# Patient Record
Sex: FEMALE | Race: OTHER | NOT HISPANIC OR LATINO
[De-identification: names, ages, dates, MRNs, and addresses within clinical notes are randomized per-mention and may not be internally consistent; named-entity substitution may affect disease eponyms.]

---

## 2019-07-08 ENCOUNTER — NON-APPOINTMENT (OUTPATIENT)
Age: 59
End: 2019-07-08

## 2019-07-08 ENCOUNTER — APPOINTMENT (OUTPATIENT)
Dept: OPHTHALMOLOGY | Facility: CLINIC | Age: 59
End: 2019-07-08
Payer: MEDICAID

## 2019-07-08 PROCEDURE — 92014 COMPRE OPH EXAM EST PT 1/>: CPT

## 2019-07-08 PROCEDURE — 92133 CPTRZD OPH DX IMG PST SGM ON: CPT

## 2019-09-04 ENCOUNTER — EMERGENCY (EMERGENCY)
Facility: HOSPITAL | Age: 59
LOS: 1 days | Discharge: HOME | End: 2019-09-04
Attending: EMERGENCY MEDICINE
Payer: MEDICAID

## 2019-09-04 VITALS
RESPIRATION RATE: 16 BRPM | OXYGEN SATURATION: 99 % | HEART RATE: 109 BPM | SYSTOLIC BLOOD PRESSURE: 140 MMHG | DIASTOLIC BLOOD PRESSURE: 90 MMHG | TEMPERATURE: 99 F

## 2019-09-04 PROCEDURE — 73030 X-RAY EXAM OF SHOULDER: CPT | Mod: 26,LT

## 2019-09-04 PROCEDURE — 71045 X-RAY EXAM CHEST 1 VIEW: CPT | Mod: 26

## 2019-09-04 PROCEDURE — 73060 X-RAY EXAM OF HUMERUS: CPT | Mod: 26,LT

## 2019-09-04 PROCEDURE — 70450 CT HEAD/BRAIN W/O DYE: CPT | Mod: 26

## 2019-09-04 PROCEDURE — 99285 EMERGENCY DEPT VISIT HI MDM: CPT

## 2019-09-04 RX ORDER — MORPHINE SULFATE 50 MG/1
4 CAPSULE, EXTENDED RELEASE ORAL ONCE
Refills: 0 | Status: DISCONTINUED | OUTPATIENT
Start: 2019-09-04 | End: 2019-09-04

## 2019-09-04 RX ORDER — TETANUS TOXOID, REDUCED DIPHTHERIA TOXOID AND ACELLULAR PERTUSSIS VACCINE, ADSORBED 5; 2.5; 8; 8; 2.5 [IU]/.5ML; [IU]/.5ML; UG/.5ML; UG/.5ML; UG/.5ML
0.5 SUSPENSION INTRAMUSCULAR ONCE
Refills: 0 | Status: COMPLETED | OUTPATIENT
Start: 2019-09-04 | End: 2019-09-04

## 2019-09-04 RX ADMIN — MORPHINE SULFATE 4 MILLIGRAM(S): 50 CAPSULE, EXTENDED RELEASE ORAL at 23:25

## 2019-09-04 RX ADMIN — TETANUS TOXOID, REDUCED DIPHTHERIA TOXOID AND ACELLULAR PERTUSSIS VACCINE, ADSORBED 0.5 MILLILITER(S): 5; 2.5; 8; 8; 2.5 SUSPENSION INTRAMUSCULAR at 23:51

## 2019-09-04 RX ADMIN — MORPHINE SULFATE 4 MILLIGRAM(S): 50 CAPSULE, EXTENDED RELEASE ORAL at 21:51

## 2019-09-04 RX ADMIN — MORPHINE SULFATE 4 MILLIGRAM(S): 50 CAPSULE, EXTENDED RELEASE ORAL at 23:51

## 2019-09-04 NOTE — ED ADULT NURSE NOTE - NSIMPLEMENTINTERV_GEN_ALL_ED
Implemented All Fall Risk Interventions:  [place] to call system. Call bell, personal items and telephone within reach. Instruct patient to call for assistance. Room bathroom lighting operational. Non-slip footwear when patient is off stretcher. Physically safe environment: no spills, clutter or unnecessary equipment. Stretcher in lowest position, wheels locked, appropriate side rails in place. Provide visual cue, wrist band, yellow gown, etc. Monitor gait and stability. Monitor for mental status changes and reorient to person, place, and time. Review medications for side effects contributing to fall risk. Reinforce activity limits and safety measures with patient and family.

## 2019-09-04 NOTE — ED ADULT NURSE REASSESSMENT NOTE - NS ED NURSE REASSESS COMMENT FT1
Pt reporting increased pain at this time; MD Simons aware, will medicate per MD orders. safety maintained. family at bedside.

## 2019-09-04 NOTE — ED ADULT NURSE NOTE - OBJECTIVE STATEMENT
58y Female PMH ___ presents to the ED c/o fall. Pt states that she was walking outside around 2050 and tripped on a dog leash and fell onto concrete on her L. side. Pt denies LOC or dizziness/CP prior to fall. Pt reporting severe 10/10 pain to L. shoulder and is supporting it with other arm. Pt also reporting mild headache at this time. Pt a&oX4, airway intact, breathing spontaneously and unlabored, lung sounds clear bilaterally, abd soft nondistended nontender to palpation, +peripheral pulses, cap refill <3 seconds, gross neuro intact, Hematoma noted to L. forehead with abrasion on it, no active bleeding or discharge noted to site, 58y Female PMH ___ presents to the ED c/o fall. Pt states that she was walking outside around 2050 and tripped on a dog leash and fell onto concrete on her L. side. Pt denies LOC or dizziness/CP prior to fall. Pt reporting severe 10/10 pain to L. shoulder and is supporting it with other arm. Pt also reporting mild headache at this time. Pt a&oX4, airway intact, breathing spontaneously and unlabored, lung sounds clear bilaterally, abd soft nondistended nontender to palpation, +peripheral pulses, cap refill <3 seconds, gross neuro intact, Hematoma noted to L. forehead with abrasion on it, no active bleeding or discharge noted to site, limited ROM noted to L. arm/shoulder due to pain, no obvious deformities noted, pt denies dizziness, CP, SOB, numbness/tingling, abd pain, pelvic pain, fever/chills. MD at bedside for eval. safety maintained.

## 2019-09-05 VITALS
TEMPERATURE: 98 F | OXYGEN SATURATION: 99 % | SYSTOLIC BLOOD PRESSURE: 122 MMHG | RESPIRATION RATE: 18 BRPM | HEART RATE: 68 BPM | DIASTOLIC BLOOD PRESSURE: 80 MMHG

## 2019-09-05 PROCEDURE — 96374 THER/PROPH/DIAG INJ IV PUSH: CPT

## 2019-09-05 PROCEDURE — 90715 TDAP VACCINE 7 YRS/> IM: CPT

## 2019-09-05 PROCEDURE — 70450 CT HEAD/BRAIN W/O DYE: CPT

## 2019-09-05 PROCEDURE — 90471 IMMUNIZATION ADMIN: CPT

## 2019-09-05 PROCEDURE — 93005 ELECTROCARDIOGRAM TRACING: CPT

## 2019-09-05 PROCEDURE — 73060 X-RAY EXAM OF HUMERUS: CPT

## 2019-09-05 PROCEDURE — 96376 TX/PRO/DX INJ SAME DRUG ADON: CPT

## 2019-09-05 PROCEDURE — 99284 EMERGENCY DEPT VISIT MOD MDM: CPT | Mod: 25

## 2019-09-05 PROCEDURE — 73030 X-RAY EXAM OF SHOULDER: CPT

## 2019-09-05 PROCEDURE — 71045 X-RAY EXAM CHEST 1 VIEW: CPT

## 2019-09-05 RX ORDER — OXYCODONE HYDROCHLORIDE 5 MG/1
1 TABLET ORAL
Qty: 12 | Refills: 0
Start: 2019-09-05 | End: 2019-09-07

## 2019-09-05 RX ORDER — IBUPROFEN 200 MG
600 TABLET ORAL ONCE
Refills: 0 | Status: COMPLETED | OUTPATIENT
Start: 2019-09-05 | End: 2019-09-05

## 2019-09-05 RX ORDER — HYDROMORPHONE HYDROCHLORIDE 2 MG/ML
0.5 INJECTION INTRAMUSCULAR; INTRAVENOUS; SUBCUTANEOUS ONCE
Refills: 0 | Status: DISCONTINUED | OUTPATIENT
Start: 2019-09-05 | End: 2019-09-05

## 2019-09-05 RX ORDER — OXYCODONE HYDROCHLORIDE 5 MG/1
5 TABLET ORAL ONCE
Refills: 0 | Status: DISCONTINUED | OUTPATIENT
Start: 2019-09-05 | End: 2019-09-05

## 2019-09-05 RX ADMIN — OXYCODONE HYDROCHLORIDE 5 MILLIGRAM(S): 5 TABLET ORAL at 00:59

## 2019-09-05 RX ADMIN — Medication 600 MILLIGRAM(S): at 00:59

## 2019-09-05 NOTE — ED PROVIDER NOTE - CONSTITUTIONAL, MLM
normal... screaming/crying in pain, difficult to redirect, but cooperative with exam; awake/alert, oriented x 4

## 2019-09-05 NOTE — ED PROVIDER NOTE - CLINICAL SUMMARY MEDICAL DECISION MAKING FREE TEXT BOX
Fall on AC, will CT head, XR shoulder Fall on AC, will CT head, XR shoulder    Attending note (Adrian): patient with mechanical fall; on dual antiplatelet therapy; will obtain CT head, and obtain xrays of chest/humerus/shoulder (likely L shoulder/humerus fracture).  Pain control as needed, ortho consult if warranted.

## 2019-09-05 NOTE — ED PROVIDER NOTE - ATTENDING CONTRIBUTION TO CARE
· HPI Objective Statement: Attending note (Adrian): 60y/o f with h/o CAD with stents on ASA/plavix, HTn, HLd, presenting with severe left shoulder pain after fall.  Patient was walking dog outside, tripped and fell over the dog leash and hit the left shoulder and then left side of head on concrete.  No LOC.  Immediate pain in the left shoulder, unable to move the left arm.  No vision changes, nausea no vomiting.  No neck pain.  No chest pain, no shortness of breath.    Triage note mentions vomiting: patient and family report no vomiting (+nausea after fall but no vomiting)    ROS as documented above.    On Physical Exam:  · CONSTITUTIONAL: screaming/crying in pain, difficult to redirect, but cooperative with exam; awake/alert, oriented x 4  · ENMT: Airway patent, Nasal mucosa clear. Mouth with normal mucosa. Throat has no vesicles, no oropharyngeal exudates and uvula is midline.  · EYES: Clear bilaterally, pupils equal, round and reactive to light.  · CARDIAC: mild tachycardia.  Heart sounds S1, S2.  No murmurs, rubs or gallops.  · RESPIRATORY: Breath sounds clear and equal bilaterally.  · GASTROINTESTINAL: Abdomen soft, non-tender, no guarding.  · MUSCULOSKELETAL: - - -  · MUSC NECK: no deformity, pain or tenderness. no restriction of movement   · Spinal Exam: no deformity, pain or tenderness. no restriction of movement   · MUSC PELVIS: stable   · MSUC EXT EXAM: left upper extremity findings  · Left Upper Location: shoulder  left shoulder pain/swelling; tenderness to proximal humerus   · MUSC VASC: no vascular compromise  radial pulses equal b/l; sensation in hands intact b/l; cap refill < 2 sec b/l in all fingers   · SKIN: Skin normal color for race, warm, dry and intact. No evidence of rash.      AP: patient with mechanical fall; on dual antiplatelet therapy; will obtain CT head, and obtain xrays of chest/humerus/shoulder (likely L shoulder/humerus fracture).  Pain control as needed, ortho consult if warranted.    ED course: patient imprved with pain medication; CT showed no acute fracture/bleeding or other pathology; x-rays confirmed proximal humerus fracture; patient and family educated on diagnosis, need for outpatient f/u with ortho; placed in sling and is stable for dc.

## 2019-09-05 NOTE — ED PROVIDER NOTE - PHYSICAL EXAMINATION
Gen: NAD  HEENT: PERRL, MMM, normal conjunctiva, anicteric, neck supple  Lung: CTAB, no adventitious sounds  CV: RRR, no murmurs, rubs or gallops  Abd: soft, NTND, no rebound or guarding, no CVAT  MSK: TTP L shoulder  Neuro: No focal neurologic deficits. CN II-XII grossly intact. 5/5 strength and normal sensation in all extremities.  Skin: Warm and dry, no evidence of rash  Psych: normal mood and affect Gen: NAD  HEENT: PERRL, MMM, normal conjunctiva, anicteric, neck supple  Lung: CTAB, no adventitious sounds  CV: RRR, no murmurs, rubs or gallops  Abd: soft, NTND, no rebound or guarding, no CVAT  MSK: TTP L shoulder  Neuro: No focal neurologic deficits. CN II-XII grossly intact. 5/5 strength and normal sensation in all extremities.  Skin: Warm and dry, no evidence of rash  Psych: normal mood and affect    Attending note (Adrian): my physical exam findings are documented below:

## 2019-09-05 NOTE — ED PROVIDER NOTE - CARE PLAN
Principal Discharge DX:	Shoulder pain Principal Discharge DX:	Humerus head fracture, left, closed, initial encounter

## 2019-09-05 NOTE — ED PROVIDER NOTE - OBJECTIVE STATEMENT
58yo F with mechanical fall on xarelto. head injury. shoulder pain Attending note (Adrian): 60y/o f with h/o CAD with stents on ASA/plavix, HTn, HLd, presenting with severe left shoulder pain after fall.  Patient was walking dog outside, tripped and fell over the dog leash and hit the left shoulder and then left side of head on concrete.  No LOC.  Immediate pain in the left shoulder, unable to move the left arm.  No vision changes, nausea no vomiting.  No neck pain.  No chest pain, no shortness of breath.    Triage note mentions vomiting: patient and family report no vomiting (+nausea after fall but no vomiting)

## 2019-09-05 NOTE — ED PROVIDER NOTE - NS ED ROS FT
ROS: denies weakness, dizziness, fevers/chills, nausea/vomiting, chest pain, SOB, diaphoresis, abdominal pain, diarrhea, neuro deficits, dysuria/hematuria, rash    +HA, shoulder pain ROS: denies weakness, dizziness, fevers/chills, nausea/vomiting, chest pain, SOB, diaphoresis, abdominal pain, diarrhea, neuro deficits, dysuria/hematuria, rash    +HA, shoulder pain    Attending note (Adrain): my review of systems is documented below:

## 2019-09-05 NOTE — ED PROVIDER NOTE - VASCULAR COMPROMISE
radial pulses equal b/l; sensation in hands intact b/l; cap refill < 2 sec b/l in all fingers/no vascular compromise

## 2019-09-05 NOTE — ED PROVIDER NOTE - PATIENT PORTAL LINK FT
You can access the FollowMyHealth Patient Portal offered by [facility] by registering at the following website: [email or web address]. By joining [employer]’s FollowMyHealth portal, you will also be able to view your health information using other applications (apps) compatible with our system.

## 2019-09-05 NOTE — ED PROVIDER NOTE - PROGRESS NOTE DETAILS
L shoulder pain. Attending note (Adrian): L shoulder pain is improved; tolerating po; left arm placed in sling for prox humerus fracture and patient is stable for dc with continued evaluation/management as outpatient.

## 2019-09-05 NOTE — ED PROVIDER NOTE - NSFOLLOWUPCLINICS_GEN_ALL_ED_FT
[facility] Orthopedic [facility]  Orthopedic Surgery  [address], 3rd & 4th [place] [address]  Phone: [phone]  Fax:   Follow Up Time:     [facility]  Orthopedic Surgery  [address] [address]  Phone: [phone]  Fax:   Follow Up Time:

## 2019-09-05 NOTE — ED PROVIDER NOTE - NSFOLLOWUPINSTRUCTIONS_ED_ALL_ED_FT
You were seen in the ER for shoulder pain/fall.  CT did not show intracranial hemorrhage.  Xray shows fracture of humerus.  Follow up with orthopedic surgery.

## 2019-09-06 ENCOUNTER — APPOINTMENT (OUTPATIENT)
Dept: ORTHOPEDIC SURGERY | Facility: CLINIC | Age: 59
End: 2019-09-06
Payer: MEDICAID

## 2019-09-06 PROCEDURE — 99203 OFFICE O/P NEW LOW 30 MIN: CPT

## 2019-09-06 NOTE — PHYSICAL EXAM
[de-identified] : Constitutional: Well-nourished, well-developed, No acute distress\par Respiratory:  Good respiratory effort, no SOB\par Lymphatic: No regional lymphadenopathy, no lymphedema\par Psychiatric: Pleasant and normal affect, alert and oriented x3\par Skin: Clean dry and intact B/L UE/LE\par Musculoskeletal: normal except where as noted in regional exam\par \par left Shoulder:\par \par APPEARANCE: + swelling/ecchymoses of the anterolateral shoulder, no marked deformities or malalignment\par POSITIVE TENDERNESS: Moderately around the anterior, lateral and posterior shoulder areas\par NONTENDER: AC joint \par ROM: Significantly limited in all directions due to pain\par RESISTIVE TESTIN/5 flexion/extension, IR/ER, abduction \par Vasc: 2+ radial pulse\par Neuro: AIN, PIN, Ulnar nerve intact to motor\par Sensation: Intact to light touch throughout\par \par  [de-identified] : Patient comes to today's visit with outside imaging already performed.  I reviewed the images in detail with the patient and discussed the findings as highlighted below. \par \par 3 views of the humerus were reviewed today that show minimally displaced fracture of the proximal humerus at the surgical neck. There are no degenerative changes seen. There is no malalignment. No obvious osseous abnormality. Otherwise unremarkable.

## 2019-09-06 NOTE — HISTORY OF PRESENT ILLNESS
[de-identified] : Marichuy is a 59F who presents with a proximal left humerus fracture.  SHe report the injury occurred two days ago after she tripped on the sidewalk after her dog pulled her forward.  She was seen in the ED where Xrays revealed a minimally displaced fracture of the surgical neck.  She has been taking oxycodone and tylenol for the pain.  She continues to have significant pain in the sling and is unable to put her shirt on as a result.  She denies numbness, tingling, weakness of the upper extremity.  She denies new trauma.

## 2019-09-06 NOTE — DISCUSSION/SUMMARY
[de-identified] : Marichuy presents with a proximal humerus fracture which occurred 2 days ago.  She has significant pain, but is neurovascularly intact.  Fracture is minimally displaced. Today we replaced her sling with a more comfortable one.  She is to return in 7-10 days and we will reimage her to assure fracture has not displaced, after which we may progress to gentle ROM pendulum exercises.  She is advised no to drive while she is taking narcotics.\par \par Aundrea Mcadams MD, EdM\par Sports Medicine PM&R

## 2019-09-20 ENCOUNTER — APPOINTMENT (OUTPATIENT)
Dept: ORTHOPEDIC SURGERY | Facility: CLINIC | Age: 59
End: 2019-09-20

## 2019-09-20 ENCOUNTER — APPOINTMENT (OUTPATIENT)
Dept: ORTHOPEDIC SURGERY | Facility: CLINIC | Age: 59
End: 2019-09-20
Payer: MEDICAID

## 2019-09-20 VITALS — WEIGHT: 180 LBS | BODY MASS INDEX: 31.89 KG/M2 | HEIGHT: 63 IN

## 2019-09-20 DIAGNOSIS — S42.202A UNSPECIFIED FRACTURE OF UPPER END OF LEFT HUMERUS, INITIAL ENCOUNTER FOR CLOSED FRACTURE: ICD-10-CM

## 2019-09-20 PROBLEM — I25.10 ATHEROSCLEROTIC HEART DISEASE OF NATIVE CORONARY ARTERY WITHOUT ANGINA PECTORIS: Chronic | Status: ACTIVE | Noted: 2019-09-11

## 2019-09-20 PROCEDURE — 99214 OFFICE O/P EST MOD 30 MIN: CPT

## 2019-09-20 PROCEDURE — 73060 X-RAY EXAM OF HUMERUS: CPT | Mod: LT

## 2019-09-20 RX ORDER — CYCLOBENZAPRINE HYDROCHLORIDE 7.5 MG/1
7.5 TABLET, FILM COATED ORAL
Qty: 14 | Refills: 0 | Status: ACTIVE | COMMUNITY
Start: 2019-09-20 | End: 1900-01-01

## 2019-09-20 NOTE — PHYSICAL EXAM
[de-identified] : Constitutional: Well-nourished, well-developed, No acute distress\par Respiratory:  Good respiratory effort, no SOB\par Lymphatic: No regional lymphadenopathy, no lymphedema\par Psychiatric: Pleasant and normal affect, alert and oriented x3\par Skin: Clean dry and intact B/L UE/LE\par Musculoskeletal: normal except where as noted in regional exam\par \par left Shoulder:\par \par APPEARANCE: + swelling/ecchymoses of the anterolateral shoulder, no marked deformities or malalignment\par POSITIVE TENDERNESS: Moderately around the anterior, lateral and posterior shoulder areas\par ROM: Significantly limited in all directions due to pain\par Vasc: 2+ radial pulse\par Neuro: AIN, PIN, Ulnar nerve intact to motor\par Sensation: Intact to light touch throughout\par \par  [de-identified] : The following radiographs were ordered and read by me during this patient's visit. I reviewed each radiograph in detail with the patient and discussed the findings as highlighted below. \par \par 3 views of the humerus were reviewed today that show minimally displaced fracture of the proximal humerus at the surgical neck. There are no degenerative changes seen. There is no malalignment. No obvious osseous abnormality. Otherwise unremarkable.

## 2019-09-20 NOTE — HISTORY OF PRESENT ILLNESS
[de-identified] : Marichuy is a 59F who presents for follow up of a proximal left humerus fracture. Injury occurred on 9/4/19.  She is no longer taking oxycodone, but still has significant pain at night.  She denies numbness, tingling, weakness of the upper extremity.  She denies new trauma.

## 2019-09-20 NOTE — DISCUSSION/SUMMARY
[de-identified] : Marichuy presents for a follow up a proximal humerus fracture which occurred on 9/4/19.  She is neurovascularly intact.  Fracture is minimally displaced, with no significant changes since her prior xrays.  She is to return in 7-10 days and we will reimage her to assure fracture has not displaced, after 4 weeks we may progress to gentle ROM exercises with PT.  She is advised no to drive while she is taking narcotics.\par \par Aundrea Mcadams MD, EdM\par Sports Medicine PM&R

## 2019-09-27 ENCOUNTER — APPOINTMENT (OUTPATIENT)
Dept: ORTHOPEDIC SURGERY | Facility: CLINIC | Age: 59
End: 2019-09-27

## 2019-10-04 ENCOUNTER — APPOINTMENT (OUTPATIENT)
Dept: ORTHOPEDIC SURGERY | Facility: CLINIC | Age: 59
End: 2019-10-04
Payer: MEDICAID

## 2019-10-04 VITALS — HEIGHT: 63 IN | WEIGHT: 180 LBS | BODY MASS INDEX: 31.89 KG/M2

## 2019-10-04 DIAGNOSIS — S49.009S: ICD-10-CM

## 2019-10-04 PROCEDURE — 99214 OFFICE O/P EST MOD 30 MIN: CPT

## 2019-10-04 PROCEDURE — 73060 X-RAY EXAM OF HUMERUS: CPT | Mod: LT

## 2019-10-04 NOTE — DISCUSSION/SUMMARY
[de-identified] : Marichuy presents for a 4 week follow up a proximal humerus fracture which occurred on 9/4/19.  She is neurovascularly intact.  Fracture is minimally displaced, with evidence of interval healing since her prior xrays.  She is to return in 2 weeks and we will reimage her to assure fracture has not displaced, discuss progression to gentle ROM exercises with PT. \par \par Aundrea Mcadams MD, EdM\par Sports Medicine PM&R

## 2019-10-04 NOTE — PHYSICAL EXAM
[de-identified] : Constitutional: Well-nourished, well-developed, No acute distress\par Respiratory:  Good respiratory effort, no SOB\par Lymphatic: No regional lymphadenopathy, no lymphedema\par Psychiatric: Pleasant and normal affect, alert and oriented x3\par Skin: Clean dry and intact B/L UE/LE\par Musculoskeletal: normal except where as noted in regional exam\par \par left Shoulder:\par \par APPEARANCE: + swelling/ecchymoses of the anterolateral shoulder, no marked deformities or malalignment\par POSITIVE TENDERNESS: Moderately around the anterior, lateral and posterior shoulder areas\par ROM: Significantly limited in all directions due to pain\par Vasc: 2+ radial pulse\par Neuro: AIN, PIN, Ulnar nerve intact to motor\par Sensation: Intact to light touch throughout\par \par  [de-identified] : The following radiographs were ordered and read by me during this patient's visit. I reviewed each radiograph in detail with the patient and discussed the findings as highlighted below. \par \par 3 views of the humerus were reviewed today that show minimally displaced fracture of the proximal humerus at the surgical neck with interval healing. There are no degenerative changes seen. There is no malalignment. No obvious osseous abnormality. Otherwise unremarkable.

## 2019-10-25 ENCOUNTER — APPOINTMENT (OUTPATIENT)
Dept: ORTHOPEDIC SURGERY | Facility: CLINIC | Age: 59
End: 2019-10-25
Payer: MEDICAID

## 2019-10-25 PROCEDURE — 73060 X-RAY EXAM OF HUMERUS: CPT | Mod: LT

## 2019-10-25 PROCEDURE — 99214 OFFICE O/P EST MOD 30 MIN: CPT

## 2019-10-27 NOTE — PHYSICAL EXAM
[de-identified] : Constitutional: Well-nourished, well-developed, No acute distress\par Respiratory:  Good respiratory effort, no SOB\par Lymphatic: No regional lymphadenopathy, no lymphedema\par Psychiatric: Pleasant and normal affect, alert and oriented x3\par Skin: Clean dry and intact B/L UE/LE\par Musculoskeletal: normal except where as noted in regional exam\par \par left Shoulder:\par \par APPEARANCE: no swelling, ecchymosis , no marked deformities or malalignment\par POSITIVE TENDERNESS: Moderately around the anterior, lateral and posterior shoulder areas\par ROM: limited in all planes due to pain\par Vasc: 2+ radial pulse\par Neuro: AIN, PIN, Ulnar nerve intact to motor\par Sensation: Intact to light touch throughout\par \par  [de-identified] : The following radiographs were ordered and read by me during this patient's visit. I reviewed each radiograph in detail with the patient and discussed the findings as highlighted below. \par \par 3 views of the humerus were reviewed today that show minimally displaced fracture of the proximal humerus at the surgical neck with interval healing. There are no degenerative changes seen. There is no malalignment. No obvious osseous abnormality. Otherwise unremarkable.

## 2019-10-27 NOTE — DISCUSSION/SUMMARY
[de-identified] : Marichuy presents for a 4 week follow up a proximal humerus fracture which occurred on 9/4/19.  She is neurovascularly intact.  Fracture is minimally displaced, with evidence of interval healing since her prior xrays.  She is to return in 4 weeks, referral to PT has been placed to begin strength and ROM as well as compensatory strategies on her right side.\par \par Aundrea Mcadams MD, EdM\par Sports Medicine PM&R

## 2019-10-27 NOTE — HISTORY OF PRESENT ILLNESS
[de-identified] : Marichuy is a 59F who presents for follow up of a proximal left humerus fracture. Injury occurred on 9/4/19.  Overall, her pain is improved.   She is now able to slightly abduct the shoulder without much pain.  She denies new trauma.

## 2019-11-08 ENCOUNTER — APPOINTMENT (OUTPATIENT)
Dept: ORTHOPEDIC SURGERY | Facility: CLINIC | Age: 59
End: 2019-11-08

## 2020-01-08 ENCOUNTER — APPOINTMENT (OUTPATIENT)
Dept: OPHTHALMOLOGY | Facility: CLINIC | Age: 60
End: 2020-01-08
Payer: MEDICAID

## 2020-01-08 ENCOUNTER — NON-APPOINTMENT (OUTPATIENT)
Age: 60
End: 2020-01-08

## 2020-01-08 PROCEDURE — 92012 INTRM OPH EXAM EST PATIENT: CPT

## 2020-01-10 ENCOUNTER — APPOINTMENT (OUTPATIENT)
Dept: ORTHOPEDIC SURGERY | Facility: CLINIC | Age: 60
End: 2020-01-10
Payer: MEDICAID

## 2020-01-10 PROCEDURE — 73030 X-RAY EXAM OF SHOULDER: CPT | Mod: LT

## 2020-01-10 PROCEDURE — 99214 OFFICE O/P EST MOD 30 MIN: CPT

## 2020-01-10 NOTE — DISCUSSION/SUMMARY
[de-identified] : Marichuy presents for follow of left humeral fracture.  She is doing well and has been progressing with PT.  She still has pain and difficulty internal rotation and overhead movement.  I have ordered an MRI to evaluate the soft tissue around the shoulder joint for damage that may have occurred during the fall.  PT prescription placed.  Follow up after MRI.\par \par Aundrea Mcadams MD, EdM\par Sports Medicine PM&R\par

## 2020-01-10 NOTE — PHYSICAL EXAM
[de-identified] : Constitutional: Well-nourished, well-developed, No acute distress\par Respiratory:  Good respiratory effort, no SOB\par Lymphatic: No regional lymphadenopathy, no lymphedema\par Psychiatric: Pleasant and normal affect, alert and oriented x3\par Skin: Clean dry and intact B/L UE/LE\par Musculoskeletal: normal except where as noted in regional exam\par left Shoulder:\par APPEARANCE: no marked deformities, no swelling or malalignment\par POSITIVE TENDERNESS: lateral humeral head, supraspinatus, LH biceps.\par NONTENDER: infraspinatus, teres minor, anterior and posterior capsule, AC joint\par ROM: painful past 90 degrees abduction/flexion, IR\par SPECIAL TESTS: neg Drop Arm, + Empty Can, + Bonner/Neers, +Brewer's, neg Speeds, neg Apprehension, neg cross arm adduction, neg apley's scratch test\par Vasc: 2+ radial pulse\par Neuro: AIN, PIN, Ulnar nerve intact to motor, DTRs 2+/4 biceps, triceps, brachioradialis\par Sensation: Intact to light touch throughout\par B/L Elbows:  No asymmetry, malalignment, or swelling, Full ROM, 5/5 strength in flexion/ext, pronation/supination, Joints stable\par B/L Wrist and Hand:  No asymmetry, malalignment, or swelling, Full ROM, 5/5 strength in wrist and long finger flexion/ext, radial/ulnar deviation, Joints stable [de-identified] : The following radiographs were ordered and read by me during this patient's visit. I reviewed each radiograph in detail with the patient and discussed the findings as highlighted below. \par \par 3 views of the humerus were reviewed today that show minimally displaced fracture of the proximal humerus at the surgical neck with interval healing. There are no degenerative changes seen. There is no malalignment. No obvious osseous abnormality. Otherwise unremarkable.

## 2020-01-10 NOTE — HISTORY OF PRESENT ILLNESS
[Stable] : stable [___ wks] : [unfilled] week(s) ago [de-identified] : Marichuy is a 59 year old woman with a previous L humeral fracture in Sept 2019 from falling outside her home. Had been doing physical therapy until about one week ago. Her pain has now returned. Cannot sleep on her left side or push off of her left arm to hop into bed. She has pain when reaching behind her and has decreased IR of her R shoulder. Strength is comparable to affected shoulder but IR and forward flexion are decreased. Has anterior clicking. Sits with rounded shoulders. Reports that she is continuing her home exercises from PT.

## 2020-01-23 ENCOUNTER — FORM ENCOUNTER (OUTPATIENT)
Age: 60
End: 2020-01-23

## 2020-01-24 ENCOUNTER — OUTPATIENT (OUTPATIENT)
Dept: OUTPATIENT SERVICES | Facility: HOSPITAL | Age: 60
LOS: 1 days | End: 2020-01-24
Payer: MEDICAID

## 2020-01-24 ENCOUNTER — APPOINTMENT (OUTPATIENT)
Dept: MRI IMAGING | Facility: CLINIC | Age: 60
End: 2020-01-24
Payer: MEDICAID

## 2020-01-24 DIAGNOSIS — S49.009S: ICD-10-CM

## 2020-01-24 DIAGNOSIS — S42.202D UNSPECIFIED FRACTURE OF UPPER END OF LEFT HUMERUS, SUBSEQUENT ENCOUNTER FOR FRACTURE WITH ROUTINE HEALING: ICD-10-CM

## 2020-01-24 PROCEDURE — 73221 MRI JOINT UPR EXTREM W/O DYE: CPT

## 2020-01-24 PROCEDURE — 73221 MRI JOINT UPR EXTREM W/O DYE: CPT | Mod: 26,LT

## 2020-01-31 ENCOUNTER — APPOINTMENT (OUTPATIENT)
Dept: ORTHOPEDIC SURGERY | Facility: CLINIC | Age: 60
End: 2020-01-31
Payer: MEDICAID

## 2020-01-31 DIAGNOSIS — S42.202D UNSPECIFIED FRACTURE OF UPPER END OF LEFT HUMERUS, SUBSEQUENT ENCOUNTER FOR FRACTURE WITH ROUTINE HEALING: ICD-10-CM

## 2020-01-31 PROCEDURE — 20550 NJX 1 TENDON SHEATH/LIGAMENT: CPT | Mod: F5

## 2020-01-31 PROCEDURE — 99214 OFFICE O/P EST MOD 30 MIN: CPT | Mod: 25

## 2020-01-31 NOTE — ADDENDUM
[FreeTextEntry1] : I, Bree Lucas ATC, assisted with the history and documentation for Dr. Mcadams on this date 01/31/2020.\par

## 2020-01-31 NOTE — HISTORY OF PRESENT ILLNESS
[Pain Location] : pain [Stable] : stable [___ mths] : [unfilled] month(s) ago [4] : a current pain level of 4/10 [de-identified] : Marichuy is a 59 year old RHD female who is following up for left shoulder pain after a fall on 9/4/2019. She had a proximal humeral fracture that is healing well. She is here to review an MRI of her shoulder. She is currently in physical therapy 2x per week. She is doing well with T Pain is described as a, 4/10 in intensity, worse when she tries to lay on it or reach behind her. She reports continued clicking/grinding sensations with movement. \par Denies bowel/bladder changes, fevers, chills, saddle anesthesia. Denies numbness, tingling, weakness of the upper extremities.\par \par Patient also complains of R thumb pain and stiffness. Repots no trauma. It started 3 weeks ago without incident.  THumb is stiff and painful at the palmar MCP joint.  She has pain with flexion of the IP.

## 2020-01-31 NOTE — PROCEDURE
[de-identified] : Ultrasound Guided Cortisone Injection \par Indication:  Ensure placement within the flexor tendon sheath\par \par Utlizing the [employer] portable ultrasound machine, the Linear 25mm 10-5 MHz transducer, sterile probe cover and sterile ultrasound gel, ultrasound guidance with the probe in the transverse axis, utilizing an out of plane approach, was used for the following injection:\par \par Injection:trigger thumb injection\par Indication: Trigger finger.\par \par A discussion was had with the patient regarding this procedure and all questions were answered. All risks, benefits and alternatives were discussed. These included but were not limited to bleeding, infection, and allergic reaction. A timeout was performed prior to the procedure to ensure proper side and location.  Chlorhexidine was used to clean and sterilize the skin over the volar aspect of the MCP. A 25-gauge needle was used to inject 0.5cc of 0.5% marcaine and 1cc of 6mg/mL betamethasone into the flexor tendon sheath. A sterile bandage was then applied. The patient tolerated

## 2020-01-31 NOTE — PHYSICAL EXAM
[de-identified] : Constitutional: Well-nourished, well-developed, No acute distress\par Respiratory:  Good respiratory effort, no SOB\par Lymphatic: No regional lymphadenopathy, no lymphedema\par Psychiatric: Pleasant and normal affect, alert and oriented x3\par Skin: Clean dry and intact B/L UE/LE\par Musculoskeletal: normal except where as noted in regional exam\par \par right Hand:\par APPEARANCE: no marked deformities, no swelling or malalignment\par POSITIVE TENDERNESS:thumb at A1 pulley\par NONTENDER: osseous and ligamentous structures. \par ROM: unable to flex IP joint\par Vasc: 2+ radial pulse, normal capillary refill\par Neuro: AIN, PIN, Ulnar nerve intact to motor\par Sensation: Intact to light touch throughout\par B/L Shoulders:  No asymmetry, malalignment, or swelling, Full ROM, 5/5 strength in flexion/ext, Abd/Add, IR/ER, Joints stable\par B/L Elbows:  No asymmetry, malalignment, or swelling, Full ROM, 5/5 strength in flex/ext, pronation/supination, Joints stable\par B/L wrists: No asymmetry, malalignment, or swelling, Full ROM, 5/5 strength in wrist flexion/ext, and radial/ulnar deviation, Joints stable\par \par \par \par  [de-identified] : Patient comes to today's visit with outside imaging already performed.  I reviewed the images in detail with the patient and discussed the findings as highlighted below. \par \par MRI shoulder:\par \par IMPRESSION: \par Moderate supraspinatus and infraspinatus tendinosis. Mild biceps tendinosis. No rotator cuff tear. \par Subacute to chronic humeral head and neck fracture with mild impaction of the anterior aspect of \par the humeral head including a portion of the articular cartilage surface and mild impaction across \par the neck. \par Thickening, intermediate signal and scarring of the middle glenohumeral ligament and anterior \par band of the inferior glenohumeral ligament related to prior injuries with mild posterior translation \par of the humeral head with respect to the glenoid without bonifacio dislocation. Chronic tearing of the \par anterior inferior labrum.

## 2020-01-31 NOTE — DISCUSSION/SUMMARY
[de-identified] : Marichuy and I discussed her MRI.  She evidence of labral tearing and scarring, with a healing fracture and rotator cuff tendinosis.  We discussed treatment options including intra articular injections, PT, arthroscopic debridement and shoulder replacement.  For now she will like to continue PT.  If she does not continue to make progress, she will return for intra articular injections.\par As for her thumb, she has evidence of flexor tenosynovitis.  I have provided her with a cortisone injection and a thumb spica brace.  She can begin gentle range of motion exercises.  Can consider hand therapy as well.\par Follow up in 1 month, or sooner for shoulder injection.

## 2020-10-02 ENCOUNTER — APPOINTMENT (OUTPATIENT)
Dept: ORTHOPEDIC SURGERY | Facility: CLINIC | Age: 60
End: 2020-10-02
Payer: MEDICAID

## 2020-10-09 ENCOUNTER — APPOINTMENT (OUTPATIENT)
Dept: ORTHOPEDIC SURGERY | Facility: CLINIC | Age: 60
End: 2020-10-09
Payer: MEDICAID

## 2020-10-09 VITALS — HEIGHT: 63 IN | BODY MASS INDEX: 31.89 KG/M2 | WEIGHT: 180 LBS

## 2020-10-09 DIAGNOSIS — M65.319 TRIGGER THUMB, UNSPECIFIED THUMB: ICD-10-CM

## 2020-10-09 PROCEDURE — 20550 NJX 1 TENDON SHEATH/LIGAMENT: CPT | Mod: RT

## 2020-10-09 PROCEDURE — 76942 ECHO GUIDE FOR BIOPSY: CPT

## 2020-10-09 PROCEDURE — 99214 OFFICE O/P EST MOD 30 MIN: CPT | Mod: 25

## 2020-10-11 PROBLEM — M65.319 TRIGGER THUMB: Status: ACTIVE | Noted: 2020-01-31

## 2020-10-13 NOTE — PROCEDURE
[de-identified] : Ultrasound Guided Cortisone Injection \par Indication:  Ensure placement within the flexor tendon sheath\par \par Utlizing the Mixx II Blushr portable ultrasound machine, the Linear 25mm 10-5 MHz transducer, sterile probe cover and sterile ultrasound gel, ultrasound guidance with the probe in the transverse axis, utilizing an out of plane approach, was used for the following injection:\par \par Injection:right trigger thumb injection\par Indication: Trigger finger.\par \par A discussion was had with the patient regarding this procedure and all questions were answered. All risks, benefits and alternatives were discussed. These included but were not limited to bleeding, infection, and allergic reaction. A timeout was performed prior to the procedure to ensure proper side and location.  Chlorhexidine was used to clean and sterilize the skin over the volar aspect of the MCP. A 25-gauge needle was used to inject 0.5cc of 0.5% marcaine and 1cc of 6mg/mL betamethasone into the flexor tendon sheath. A sterile bandage was then applied. The patient tolerated

## 2020-10-13 NOTE — DISCUSSION/SUMMARY
[de-identified] : Marichuy and I discussed her flexor tenosynovitis.  I have provided her with a cortisone injection and a thumb spica brace.  She can begin gentle range of motion exercises.  Can consider hand therapy as well.  Follow up as needed.\par \par Aundrea Mcadams MD, EdM\par Sports Medicine [facility]\par Department of Orthopedics\par

## 2020-10-13 NOTE — HISTORY OF PRESENT ILLNESS
[de-identified] : Marichuy is a 59 year old also complains of R thumb pain and stiffness. Repots no trauma. It started  2 weeks ago without incident.  THumb is stiff and painful at the palmar MCP joint.  She has pain with flexion of the IP.

## 2020-10-13 NOTE — PHYSICAL EXAM
[de-identified] : Constitutional: Well-nourished, well-developed, No acute distress\par Respiratory:  Good respiratory effort, no SOB\par Lymphatic: No regional lymphadenopathy, no lymphedema\par Psychiatric: Pleasant and normal affect, alert and oriented x3\par Skin: Clean dry and intact B/L UE/LE\par Musculoskeletal: normal except where as noted in regional exam\par \par right Hand:\par APPEARANCE: no marked deformities, no swelling or malalignment\par POSITIVE TENDERNESS:thumb at A1 pulley\par NONTENDER: osseous and ligamentous structures. \par ROM: unable to flex IP joint\par Vasc: 2+ radial pulse, normal capillary refill\par Neuro: AIN, PIN, Ulnar nerve intact to motor\par Sensation: Intact to light touch throughout\par B/L Shoulders:  No asymmetry, malalignment, or swelling, Full ROM, 5/5 strength in flexion/ext, Abd/Add, IR/ER, Joints stable\par B/L Elbows:  No asymmetry, malalignment, or swelling, Full ROM, 5/5 strength in flex/ext, pronation/supination, Joints stable\par B/L wrists: No asymmetry, malalignment, or swelling, Full ROM, 5/5 strength in wrist flexion/ext, and radial/ulnar deviation, Joints stable\par \par \par \par

## 2021-04-30 NOTE — REVIEW OF SYSTEMS
Cardiology Clinic Follow-Up Visit  [facility] [place] [id number]  [address]  [facility] [address]  Dept Phone: [phone]    Rosalind Aquino  : 1956  PCP: Saumya Cheung Kiara Zambrano NP     [Joint Pain] : joint pain [Joint Stiffness] : joint stiffness [Negative] : Heme/Lymph

## 2021-06-17 ENCOUNTER — EMERGENCY (EMERGENCY)
Facility: HOSPITAL | Age: 61
LOS: 1 days | Discharge: HOME | End: 2021-06-17
Attending: EMERGENCY MEDICINE
Payer: MEDICAID

## 2021-06-17 VITALS
SYSTOLIC BLOOD PRESSURE: 137 MMHG | DIASTOLIC BLOOD PRESSURE: 60 MMHG | OXYGEN SATURATION: 100 % | HEART RATE: 59 BPM | RESPIRATION RATE: 16 BRPM

## 2021-06-17 VITALS
HEIGHT: 63 IN | WEIGHT: 175.05 LBS | SYSTOLIC BLOOD PRESSURE: 123 MMHG | TEMPERATURE: 98 F | OXYGEN SATURATION: 98 % | HEART RATE: 55 BPM | RESPIRATION RATE: 10 BRPM | DIASTOLIC BLOOD PRESSURE: 80 MMHG

## 2021-06-17 LAB
ALBUMIN SERPL ELPH-MCNC: 3.9 G/DL — SIGNIFICANT CHANGE UP (ref 3.3–5)
ALP SERPL-CCNC: 77 U/L — SIGNIFICANT CHANGE UP (ref 40–120)
ALT FLD-CCNC: 22 U/L — SIGNIFICANT CHANGE UP (ref 10–45)
ANION GAP SERPL CALC-SCNC: 11 MMOL/L — SIGNIFICANT CHANGE UP (ref 5–17)
AST SERPL-CCNC: 17 U/L — SIGNIFICANT CHANGE UP (ref 10–40)
BASOPHILS # BLD AUTO: 0.06 K/UL — SIGNIFICANT CHANGE UP (ref 0–0.2)
BASOPHILS NFR BLD AUTO: 0.8 % — SIGNIFICANT CHANGE UP (ref 0–2)
BILIRUB SERPL-MCNC: 0.3 MG/DL — SIGNIFICANT CHANGE UP (ref 0.2–1.2)
BUN SERPL-MCNC: 26 MG/DL — HIGH (ref 7–23)
CALCIUM SERPL-MCNC: 9.6 MG/DL — SIGNIFICANT CHANGE UP (ref 8.4–10.5)
CHLORIDE SERPL-SCNC: 109 MMOL/L — HIGH (ref 96–108)
CO2 SERPL-SCNC: 20 MMOL/L — LOW (ref 22–31)
CREAT SERPL-MCNC: 0.83 MG/DL — SIGNIFICANT CHANGE UP (ref 0.5–1.3)
EOSINOPHIL # BLD AUTO: 0.11 K/UL — SIGNIFICANT CHANGE UP (ref 0–0.5)
EOSINOPHIL NFR BLD AUTO: 1.4 % — SIGNIFICANT CHANGE UP (ref 0–6)
GLUCOSE SERPL-MCNC: 134 MG/DL — HIGH (ref 70–99)
HCT VFR BLD CALC: 38.2 % — SIGNIFICANT CHANGE UP (ref 34.5–45)
HGB BLD-MCNC: 12.4 G/DL — SIGNIFICANT CHANGE UP (ref 11.5–15.5)
LYMPHOCYTES # BLD AUTO: 2.45 K/UL — SIGNIFICANT CHANGE UP (ref 1–3.3)
LYMPHOCYTES # BLD AUTO: 30.6 % — SIGNIFICANT CHANGE UP (ref 13–44)
MCHC RBC-ENTMCNC: 28 PG — SIGNIFICANT CHANGE UP (ref 27–34)
MCHC RBC-ENTMCNC: 32.5 GM/DL — SIGNIFICANT CHANGE UP (ref 32–36)
MCV RBC AUTO: 86.2 FL — SIGNIFICANT CHANGE UP (ref 80–100)
MONOCYTES # BLD AUTO: 0.54 K/UL — SIGNIFICANT CHANGE UP (ref 0–0.9)
MONOCYTES NFR BLD AUTO: 6.8 % — SIGNIFICANT CHANGE UP (ref 2–14)
NEUTROPHILS # BLD AUTO: 4.84 K/UL — SIGNIFICANT CHANGE UP (ref 1.8–7.4)
NEUTROPHILS NFR BLD AUTO: 60.4 % — SIGNIFICANT CHANGE UP (ref 43–77)
NRBC # BLD: 0 /100 WBCS — SIGNIFICANT CHANGE UP (ref 0–0)
PLATELET # BLD AUTO: 265 K/UL — SIGNIFICANT CHANGE UP (ref 150–400)
POTASSIUM SERPL-MCNC: 3.8 MMOL/L — SIGNIFICANT CHANGE UP (ref 3.5–5.3)
POTASSIUM SERPL-SCNC: 3.8 MMOL/L — SIGNIFICANT CHANGE UP (ref 3.5–5.3)
PROT SERPL-MCNC: 6.9 G/DL — SIGNIFICANT CHANGE UP (ref 6–8.3)
RBC # BLD: 4.43 M/UL — SIGNIFICANT CHANGE UP (ref 3.8–5.2)
RBC # FLD: 13.5 % — SIGNIFICANT CHANGE UP (ref 10.3–14.5)
SODIUM SERPL-SCNC: 140 MMOL/L — SIGNIFICANT CHANGE UP (ref 135–145)
TROPONIN T, HIGH SENSITIVITY RESULT: 7 NG/L — SIGNIFICANT CHANGE UP (ref 0–51)
TROPONIN T, HIGH SENSITIVITY RESULT: 8 NG/L — SIGNIFICANT CHANGE UP (ref 0–51)
WBC # BLD: 8 K/UL — SIGNIFICANT CHANGE UP (ref 3.8–10.5)
WBC # FLD AUTO: 8 K/UL — SIGNIFICANT CHANGE UP (ref 3.8–10.5)

## 2021-06-17 PROCEDURE — 85025 COMPLETE CBC W/AUTO DIFF WBC: CPT

## 2021-06-17 PROCEDURE — 99284 EMERGENCY DEPT VISIT MOD MDM: CPT | Mod: 25

## 2021-06-17 PROCEDURE — 93005 ELECTROCARDIOGRAM TRACING: CPT

## 2021-06-17 PROCEDURE — 84484 ASSAY OF TROPONIN QUANT: CPT

## 2021-06-17 PROCEDURE — 93010 ELECTROCARDIOGRAM REPORT: CPT

## 2021-06-17 PROCEDURE — 71045 X-RAY EXAM CHEST 1 VIEW: CPT

## 2021-06-17 PROCEDURE — 99284 EMERGENCY DEPT VISIT MOD MDM: CPT

## 2021-06-17 PROCEDURE — 80053 COMPREHEN METABOLIC PANEL: CPT

## 2021-06-17 PROCEDURE — 71045 X-RAY EXAM CHEST 1 VIEW: CPT | Mod: 26

## 2021-06-17 NOTE — ED PROVIDER NOTE - NSFOLLOWUPINSTRUCTIONS_ED_ALL_ED_FT
Stay well hydrated.  Continue home medications as prescribed.   Follow-up with your primary doctor within 1-2 days  Bring a copy of your results with you  Return to an ER for worsening symptoms or any other concerns.

## 2021-06-17 NOTE — ED PROVIDER NOTE - OBJECTIVE STATEMENT
61 yo F hx of anxiety and CAD with 5 stents, pw nausea. at 4Am, pw awoke to find her son seizing and biting on his tongue. This was the first time pt has witnessed her son with active seizure. Pt felt acute onset whole body tingling, nausea, and passed out on the cough, no head trauma. Pt regained consciousness a few minutes later, felt at baseline, but took a xanax. Pt family urged her to present to the ed with concern she "had a heart attack".

## 2021-06-17 NOTE — ED PROVIDER NOTE - ATTENDING CONTRIBUTION TO CARE
attending Sujatha: 60yF h/o anxiety and CAD with 5 prior stents, p/w nausea. Started last night after she awoke to her son having a seizure. Pt reports total body tingling, nausea and syncopal episode on to the couch, no head trauma. Since feeling at baseline. Given prior history family urged her to be evaluated in ED. Benign exam. Will obtain ekg, place on tele, labs including trop, reassess

## 2021-06-17 NOTE — ED ADULT NURSE NOTE - CAS DISCH TRANSFER METHOD
Referred by: Dot Arenas MD; Medical Diagnosis (from order):    Diagnosis Information      Diagnosis    723.4 (ICD-9-CM) - M54.12 (ICD-10-CM) - Cervical radiculopathy                Initial Evaluation    Visit:  1   Treatment Diagnosis: cervical, symptoms with increased pain/symptoms, impaired posture, impaired range of motion, impaired muscle length/flexibility, impaired strength, impaired joint play/mobility, impaired activity tolerance  Date of onset: 6 months ago  Diagnosis Precautions: none  Chart reviewed at time of initial evaluation (relevant co-morbidities, allergies, tests and medications listed): H/o breast CA 3 years ago & uterine/ovarian CA 1 year ago.  Diagnostic Tests: No diagnostic tests were performed    SUBJECTIVE                                                                                                             Patient reports that she has had right neck pain for about 6 months.  No known injury.  Worst when laying on left.  patient has numbness right upper extremity due to breast CA surgery so can't feel if radiates down upper extremity but assumes that it does.  patient had physical therapy following breast surgery.  Bio Freeze helps.  Ibuprofen as needed.    Pain / Symptoms:  Pain rating (out of 10): Current: 3 ; Best: 0; Worst: 8Location: Right neck   Quality / Description: throbbing, tight, sore, ache.  Alleviating Factors: topical agents/patch.   Function:   Limitations / Exacerbation Factors: sleep disturbed, grooming/hygiene/self-care activities, driving/riding in a vehicle, sitting  Prior Level of Function: pain free ADLs and IADLs,  Patient Goals: decreased pain    Prior treatment: no therapies  Discharged from hospital, home health, or skilled nursing facility in last 30 days: no    Home Environment:   Patient lives with significant other  Assistance available: consistent  Feels safe at home/work/school.  2 or more falls or an unexplained fall with injury in the last  year:  No    OBJECTIVE                                                                                                                   Hand Dominance: left-handed;     Observation:   Cervical: forward head  Shoulder: rounded    • Left Scapula: depressed and protracted    • Right Scapula: elevated and protracted  Comments / Details: Flattened thoracic & cervical noted    Range of Motion (ROM)   (degrees unless noted; active unless noted; norms in ( ); negative=lacking to 0, positive=beyond 0)   WNL: LUE  Shoulder:     - Flexion (180):        • Right: 130 pain     - Abduction (180):        • Right: 110 pain    - Internal Rotation at 0°:         • Right: WNL    - External Rotation at 0°:         • Right: 65  Cervical WNL, except  Cervical:    - Flexion (45-50): 50% pain    - Rotation (60-80):        • Left: 50%        • Right: 50% pain    - Side Bend (45):        • Left: 50% pain        • Right: 25%    Strength  (out of 5 unless noted, standard test position unless noted, lbs tested with hand held dynamometer)   5/5: LUE, RUE, except as noted  Shoulder:     - Abduction:        • Right (C5): 4+      Palpation:   Spine - Right: Anterior Scalenes: tenderness; Middle Scalenes: tenderness; Upper Trapezius: tenderness; levator: tenderness    Muscle Flexibility:   - Pectoralis Major: Right: minimal limitation  - Pectoralis Minor: Left: moderate limitation Right: moderate limitation  - Scalenes: Right: moderate limitation  - Upper Trapezius: Right: minimal limitation    Joint Play:   Cervical Spine:     - C2-C3: Right: hypomobile    - C3-C4: Right: hypomobile    - C4-C5: Right: hypomobile    - C5-C6: Right: hypomobile    - C6-C7: Right: hypomobile    - C7-T1: Right: hypomobile    Special Tests:  Median Nerve Tension Test: Right: negative  Ulnar Nerve Tension Test: Right: negative  Radial Nerve Tension Test: Right: negative  Cervical Compression in sitting: Right: negative  Cervical Distraction in supine: Right:  negative    Comments / Details: Outcome Measures:   Neck Disability Index (NDI): Neck Disability Index Score: 6  NDI Total Possible Score: 50  NDI Score Calculated: 12 %  (scored 0-100, higher score indicates higher disability) see flowsheet for additional documentation    TREATMENT                                                                                                                initial evaluation completed    Therapeutic Activity:  Patient was instructed on HEP as documented below & issued handouts.  Patient was educated on evaluation findings / pathology & treatment plan    Skilled input: verbal instruction/cues    Writer verbally educated and received verbal consent for hand placement, positioning of patient, and techniques to be performed today from patient for hand placement and palpation for techniques and clothing adjustments for techniques as described above and how they are pertinent to the patient's plan of care.    Home Exercise Program: Access Code: [email or web address]     Exercises  · Seated Cervical Retraction -  · Corner Pec Major Stretch -  · Supine Chest Stretch on Foam Roll -   · Standing Scapular Retraction -          ASSESSMENT                                                                                                           53 year old female patient has reported functional limitations listed above impacted by signs and symptoms consistent with below   • Involved: cervical   • Symptoms/impairments: increased pain/symptoms, impaired posture, impaired range of motion, impaired muscle length/flexibility, impaired strength, impaired joint play/mobility and impaired activity tolerance    Prognosis: patient will benefit from skilled therapy  : good  Predicted patient presentation: Moderate (evolving) - Patient comorbidities and complexities, as defined above, may have varying impact on steady progress for prescribed plan of care.  Patient Education:   Who will be receiving education:  patient  Are they ready to learn: yes  Preferred learning style: written, verbal and demonstration  Barriers to learning: no barriers apparent at this time  Results of above outlined education: Verbalizes understanding and Demonstrates understanding      PLAN                                                                                                                         The following skilled interventions to be implemented to achieve goals listed below:  Dry Needling  Manual Therapy (85792)  Neuromuscular Re-Education (50935)  Therapeutic Activity (75705)  Therapeutic Exercise (35527)    Frequency / Duration: 2 times per week tapering as patient progresses for an estimated total of 12 visits for 8 weeks    Patient involved in and agreed to plan of care and goals.  Patient given attendance policy at time of initial evaluation.  Suggestions for next session as indicated: Progress per plan of care, STM, posture education, thoracic mobility      GOALS                                                                                                                           Decrease pain/symptoms to 2/10  Improve involved ROM to 25% limited cervical  The above improvements in impairments to assist in obtaining goals listed below  Long Term Goals: to be met be end of plan of care  1. Patient will rotate head to be able to see blind spots and behind car for safe driving.  2. Patient will sleep at prior level of function per patient report.  3. Patient will reach overhead with reported manageable/tolerable difficulty for house cleaning and care such as sweeping, vacuuming, dusting   4. Patient will be independent with progressed and modified home exercise program.  5. Neck Disability Index: Patient will complete form to reflect an improved score to less than or equal to 5% to indicate patient reported improvement in function/disability/impairment (minimal detectable change: 21%).      Therapy procedure time and total  treatment time can be found documented on the Time Entry flowsheet   Private car

## 2021-06-17 NOTE — ED PROVIDER NOTE - CLINICAL SUMMARY MEDICAL DECISION MAKING FREE TEXT BOX
61 yo F hx of anxiety and CAD with 5 stents, pw nausea. at 4Am, pw awoke to find her son seizing and biting on his tongue. no sig finding on exam, back at baseline in the ED. likely vasovagal syncope, very low suspicion for ACS, but will obtain ED chest pain workup. likely DC home.

## 2021-06-17 NOTE — ED PROVIDER NOTE - PHYSICAL EXAMINATION
GEN: Patient awake alert NAD.   HEENT: normocephalic, atraumatic, EOMI, no scleral icterus, moist MM  CARDIAC: RRR, S1, S2, no murmur.   PULM: CTA B/L no wheeze, rhonchi, rales.   ABD: soft NT, ND, no rebound no guarding, no CVA tenderness.   MSK: Moving all extremities, no edema. 5/5 strength and full ROM in all extremities.   NEURO: A&Ox3, gait normal, no focal neurological deficits, CN 2-12 grossly intact  SKIN: warm, dry, no rash.

## 2021-06-17 NOTE — ED PROVIDER NOTE - PATIENT PORTAL LINK FT
You can access the FollowMyHealth Patient Portal offered by [facility] by registering at the following website: [email or web address]. By joining [facility]’s FollowMyHealth portal, you will also be able to view your health information using other applications (apps) compatible with our system.

## 2021-06-17 NOTE — ED ADULT NURSE NOTE - OBJECTIVE STATEMENT
59 yo F hx of anxiety and CAD with 5 stents, pw nausea. at 4Am, pw awoke to find her son seizing and biting on his tongue. This was the first time pt has witnessed her son with active seizure. Pt felt acute onset whole body tingling, nausea, and passed out on the cough, no head trauma. Pt regained consciousness a few minutes later, felt at baseline, but took a xanax. Pt family urged her to present to the ed with concern she "had a heart attack".

## 2021-06-17 NOTE — ED PROVIDER NOTE - NS ED ROS FT
GENERAL: No fever, chills  EYES: no vision changes, no discharge.   ENT: no difficulty swallowing or speaking   CARDIAC: no chest pain/pressure, SOB, lower extremity swelling  PULMONARY: no cough, SOB  GI: no abdominal pain, + nausea, no vomiting, no diarrhea.   : no dysuria, no hematuria  SKIN: no rashes, no ecchymosis  NEURO: no headache, lightheadedness + syncope.   MSK: No joint pain, myalgia, weakness.

## 2021-12-14 NOTE — HISTORY OF PRESENT ILLNESS
[de-identified] : Marichuy is a 59F who presents for follow up of a proximal left humerus fracture. Injury occurred on 9/4/19.  Overall, her pain is improved.   She is now able to slightly abduct the shoulder without much pain.  She can now shower on her own.  She denies new trauma.  She is now starting to develop some right sided shoulder and wrist pain from using her arm. Implemented All Fall Risk Interventions:  [place] to call system. Call bell, personal items and telephone within reach. Instruct patient to call for assistance. Room bathroom lighting operational. Non-slip footwear when patient is off stretcher. Physically safe environment: no spills, clutter or unnecessary equipment. Stretcher in lowest position, wheels locked, appropriate side rails in place. Provide visual cue, wrist band, yellow gown, etc. Monitor gait and stability. Monitor for mental status changes and reorient to person, place, and time. Review medications for side effects contributing to fall risk. Reinforce activity limits and safety measures with patient and family.

## 2021-12-19 ENCOUNTER — EMERGENCY (EMERGENCY)
Facility: HOSPITAL | Age: 61
LOS: 1 days | Discharge: HOME | End: 2021-12-19
Attending: EMERGENCY MEDICINE
Payer: MEDICAID

## 2021-12-19 VITALS
HEIGHT: 63 IN | TEMPERATURE: 98 F | WEIGHT: 171.96 LBS | OXYGEN SATURATION: 98 % | SYSTOLIC BLOOD PRESSURE: 156 MMHG | DIASTOLIC BLOOD PRESSURE: 92 MMHG | RESPIRATION RATE: 18 BRPM | HEART RATE: 70 BPM

## 2021-12-19 VITALS
SYSTOLIC BLOOD PRESSURE: 125 MMHG | DIASTOLIC BLOOD PRESSURE: 91 MMHG | OXYGEN SATURATION: 96 % | TEMPERATURE: 98 F | HEART RATE: 63 BPM | RESPIRATION RATE: 17 BRPM

## 2021-12-19 LAB
ALBUMIN SERPL ELPH-MCNC: 4.3 G/DL — SIGNIFICANT CHANGE UP (ref 3.3–5)
ALP SERPL-CCNC: 76 U/L — SIGNIFICANT CHANGE UP (ref 40–120)
ALT FLD-CCNC: 37 U/L — SIGNIFICANT CHANGE UP (ref 10–45)
ANION GAP SERPL CALC-SCNC: 14 MMOL/L — SIGNIFICANT CHANGE UP (ref 5–17)
AST SERPL-CCNC: 26 U/L — SIGNIFICANT CHANGE UP (ref 10–40)
BASOPHILS # BLD AUTO: 0.07 K/UL — SIGNIFICANT CHANGE UP (ref 0–0.2)
BASOPHILS NFR BLD AUTO: 1 % — SIGNIFICANT CHANGE UP (ref 0–2)
BILIRUB SERPL-MCNC: 0.4 MG/DL — SIGNIFICANT CHANGE UP (ref 0.2–1.2)
BUN SERPL-MCNC: 15 MG/DL — SIGNIFICANT CHANGE UP (ref 7–23)
CALCIUM SERPL-MCNC: 10 MG/DL — SIGNIFICANT CHANGE UP (ref 8.4–10.5)
CHLORIDE SERPL-SCNC: 100 MMOL/L — SIGNIFICANT CHANGE UP (ref 96–108)
CO2 SERPL-SCNC: 23 MMOL/L — SIGNIFICANT CHANGE UP (ref 22–31)
CREAT SERPL-MCNC: 0.99 MG/DL — SIGNIFICANT CHANGE UP (ref 0.5–1.3)
EOSINOPHIL # BLD AUTO: 0.03 K/UL — SIGNIFICANT CHANGE UP (ref 0–0.5)
EOSINOPHIL NFR BLD AUTO: 0.4 % — SIGNIFICANT CHANGE UP (ref 0–6)
GLUCOSE SERPL-MCNC: 101 MG/DL — HIGH (ref 70–99)
HCT VFR BLD CALC: 40.5 % — SIGNIFICANT CHANGE UP (ref 34.5–45)
HGB BLD-MCNC: 12.8 G/DL — SIGNIFICANT CHANGE UP (ref 11.5–15.5)
IMM GRANULOCYTES NFR BLD AUTO: 0.1 % — SIGNIFICANT CHANGE UP (ref 0–1.5)
LYMPHOCYTES # BLD AUTO: 1.41 K/UL — SIGNIFICANT CHANGE UP (ref 1–3.3)
LYMPHOCYTES # BLD AUTO: 20 % — SIGNIFICANT CHANGE UP (ref 13–44)
MAGNESIUM SERPL-MCNC: 1.8 MG/DL — SIGNIFICANT CHANGE UP (ref 1.6–2.6)
MCHC RBC-ENTMCNC: 27.1 PG — SIGNIFICANT CHANGE UP (ref 27–34)
MCHC RBC-ENTMCNC: 31.6 GM/DL — LOW (ref 32–36)
MCV RBC AUTO: 85.6 FL — SIGNIFICANT CHANGE UP (ref 80–100)
MONOCYTES # BLD AUTO: 0.69 K/UL — SIGNIFICANT CHANGE UP (ref 0–0.9)
MONOCYTES NFR BLD AUTO: 9.8 % — SIGNIFICANT CHANGE UP (ref 2–14)
NEUTROPHILS # BLD AUTO: 4.85 K/UL — SIGNIFICANT CHANGE UP (ref 1.8–7.4)
NEUTROPHILS NFR BLD AUTO: 68.7 % — SIGNIFICANT CHANGE UP (ref 43–77)
NRBC # BLD: 0 /100 WBCS — SIGNIFICANT CHANGE UP (ref 0–0)
PHOSPHATE SERPL-MCNC: 3.8 MG/DL — SIGNIFICANT CHANGE UP (ref 2.5–4.5)
PLATELET # BLD AUTO: 266 K/UL — SIGNIFICANT CHANGE UP (ref 150–400)
POTASSIUM SERPL-MCNC: 4.4 MMOL/L — SIGNIFICANT CHANGE UP (ref 3.5–5.3)
POTASSIUM SERPL-SCNC: 4.4 MMOL/L — SIGNIFICANT CHANGE UP (ref 3.5–5.3)
PROT SERPL-MCNC: 7.3 G/DL — SIGNIFICANT CHANGE UP (ref 6–8.3)
RBC # BLD: 4.73 M/UL — SIGNIFICANT CHANGE UP (ref 3.8–5.2)
RBC # FLD: 13.3 % — SIGNIFICANT CHANGE UP (ref 10.3–14.5)
SODIUM SERPL-SCNC: 137 MMOL/L — SIGNIFICANT CHANGE UP (ref 135–145)
WBC # BLD: 7.06 K/UL — SIGNIFICANT CHANGE UP (ref 3.8–10.5)
WBC # FLD AUTO: 7.06 K/UL — SIGNIFICANT CHANGE UP (ref 3.8–10.5)

## 2021-12-19 PROCEDURE — 99284 EMERGENCY DEPT VISIT MOD MDM: CPT

## 2021-12-19 PROCEDURE — 85025 COMPLETE CBC W/AUTO DIFF WBC: CPT

## 2021-12-19 PROCEDURE — 83735 ASSAY OF MAGNESIUM: CPT

## 2021-12-19 PROCEDURE — 84100 ASSAY OF PHOSPHORUS: CPT

## 2021-12-19 PROCEDURE — 80053 COMPREHEN METABOLIC PANEL: CPT

## 2021-12-19 PROCEDURE — 87077 CULTURE AEROBIC IDENTIFY: CPT

## 2021-12-19 PROCEDURE — 87070 CULTURE OTHR SPECIMN AEROBIC: CPT

## 2021-12-19 PROCEDURE — 96374 THER/PROPH/DIAG INJ IV PUSH: CPT

## 2021-12-19 PROCEDURE — 99284 EMERGENCY DEPT VISIT MOD MDM: CPT | Mod: 25

## 2021-12-19 PROCEDURE — 87075 CULTR BACTERIA EXCEPT BLOOD: CPT

## 2021-12-19 RX ORDER — CEPHALEXIN 500 MG
1 CAPSULE ORAL
Qty: 28 | Refills: 0
Start: 2021-12-19

## 2021-12-19 RX ORDER — CEFAZOLIN SODIUM 1 G
500 VIAL (EA) INJECTION ONCE
Refills: 0 | Status: COMPLETED | OUTPATIENT
Start: 2021-12-19 | End: 2021-12-19

## 2021-12-19 RX ORDER — VALACYCLOVIR 500 MG/1
1000 TABLET, FILM COATED ORAL ONCE
Refills: 0 | Status: COMPLETED | OUTPATIENT
Start: 2021-12-19 | End: 2021-12-19

## 2021-12-19 RX ORDER — FLUORESCEIN SODIUM 9 MG
1 STRIP OPHTHALMIC (EYE) ONCE
Refills: 0 | Status: COMPLETED | OUTPATIENT
Start: 2021-12-19 | End: 2021-12-19

## 2021-12-19 RX ORDER — CEFAZOLIN SODIUM 1 G
VIAL (EA) INJECTION
Refills: 0 | Status: COMPLETED | OUTPATIENT
Start: 2021-12-19 | End: 2021-12-19

## 2021-12-19 RX ORDER — CEFAZOLIN SODIUM 1 G
500 VIAL (EA) INJECTION EVERY 8 HOURS
Refills: 0 | Status: DISCONTINUED | OUTPATIENT
Start: 2021-12-19 | End: 2021-12-19

## 2021-12-19 RX ORDER — VALACYCLOVIR 500 MG/1
1 TABLET, FILM COATED ORAL
Qty: 14 | Refills: 0
Start: 2021-12-19 | End: 2021-12-25

## 2021-12-19 RX ADMIN — Medication 1 APPLICATION(S): at 04:32

## 2021-12-19 RX ADMIN — VALACYCLOVIR 1000 MILLIGRAM(S): 500 TABLET, FILM COATED ORAL at 04:08

## 2021-12-19 RX ADMIN — Medication 100 MILLIGRAM(S): at 05:22

## 2021-12-19 NOTE — ED PROVIDER NOTE - NSFOLLOWUPINSTRUCTIONS_ED_ALL_ED_FT
You were seen in the ED for herpes zoster and possible bacterial skin infection. The herpes zoster will be treated with valtrex which should help with healing and pain. The skin infection will be covered with an antibiotic (keflex). Please complete both medications, and stay hydrated whilst on both medications. If you develop high fevers, worsening pain or severe headaches please return to care. Please follow up with your primary care doctor in the next week.

## 2021-12-19 NOTE — ED PROVIDER NOTE - ATTENDING CONTRIBUTION TO CARE
Pt with gradual onset of worsening R forehead swelling, reddness with blistering with clear drainage, on antibx with no relief.  No eye involvement, no systemic symptoms.  About 4cm diameter indurated, erythematous rash R forehead with slight subcentimeter blistering overlying, crosses midline of forehead.  Suspect cellulitis with superifical skin blistering more than shingles but will treat for both.  Check labs and first dose IV antibiotics.

## 2021-12-19 NOTE — ED PROVIDER NOTE - CLINICAL SUMMARY MEDICAL DECISION MAKING FREE TEXT BOX
[place] PGY-3: 61 yo F hx of anxiety and CAD with 5 stents here with wounds on R side of forehead, suspicious for shingles outbreak. Will likely DC with valtrex and prednisone after fluoroscein exam

## 2021-12-19 NOTE — ED ADULT NURSE NOTE - OBJECTIVE STATEMENT
61 y old female with PMH of CAD presents to the ED from home c/o redness, pain and swelling to R forehead. Pt reports she noticed the open wound today. Pt reports pain is dull and constant in forehead. Denies fevers, chills, CP, SOB, abd pain, n/v/d, urinary s/s, weakness. Pt A&O x 4, ambulatory. Open wound noted to R forehead. No active bleeding or drainage. Respirations spontaneous and unlabored. Peripheral pulses strong. Skin warm, dry and intact. Bed in lowest position, side rails up and call bell in reach.

## 2021-12-19 NOTE — ED PROVIDER NOTE - PHYSICAL EXAMINATION
GENERAL: NAD, lying in bed comfortably  HEAD:  2-3 Grouped ulcerated and crusted wounds on R frontal forehead, not crossing midline. Some underlying redness of skin.  EYES: EOMI, PERRLA, conjunctiva and sclera clear  ENT: Moist mucous membranes  NECK: Supple, No JVD  CHEST/LUNG: Clear to auscultation bilaterally; No rales, rhonchi, wheezing, or rubs. Unlabored respirations  HEART: Regular rate and rhythm; No murmurs, rubs, or gallops  ABDOMEN: Bowel sounds present; Soft, Nontender, Nondistended. No hepatomegally  EXTREMITIES:  2+ Peripheral Pulses, brisk capillary refill. No clubbing, cyanosis, or edema  NERVOUS SYSTEM:  Alert & Oriented X3, speech clear. No deficits   MSK: FROM all 4 extremities, full and equal strength  SKIN: No rashes or lesions  PSYCH: No SI/HI, normal affect GENERAL: NAD, lying in bed comfortably  HEAD:  2-3 Grouped ulcerated and crusted wounds on R frontal forehead, not crossing midline. Some underlying redness of skin.  EYES: no dendritic ulcers present on bedside fluoroscein exam  ENT: Moist mucous membranes  NECK: Supple, No JVD  CHEST/LUNG: Clear to auscultation bilaterally; No rales, rhonchi, wheezing, or rubs. Unlabored respirations  HEART: Regular rate and rhythm; No murmurs, rubs, or gallops  ABDOMEN: Bowel sounds present; Soft, Nontender, Nondistended. No hepatomegally  EXTREMITIES:  2+ Peripheral Pulses, brisk capillary refill. No clubbing, cyanosis, or edema  NERVOUS SYSTEM:  Alert & Oriented X3, speech clear. No deficits   MSK: FROM all 4 extremities, full and equal strength  SKIN: No rashes or lesions  PSYCH: No SI/HI, normal affect

## 2021-12-19 NOTE — ED PROVIDER NOTE - CARE PLAN
1 Principal Discharge DX:	Cellulitis  Secondary Diagnosis:	Herpes zoster   Principal Discharge DX:	Cellulitis of forehead  Secondary Diagnosis:	Herpes zoster

## 2021-12-19 NOTE — ED PROVIDER NOTE - OBJECTIVE STATEMENT
59 yo F hx of anxiety and CAD with 5 stents here with wounds on R side of forehead. Notes she worse sunglasses and possibly irriitated her skin last week. Notes no cut or wounds, but notes she started to have irritation and a couple of ulcerations that developed. Notes she now has radiating burning pain from from of head down laterally on the R side. Notes no  fevers, chills, SOB, CP, vision changes.

## 2021-12-19 NOTE — ED PROVIDER NOTE - NS ED ROS FT
REVIEW OF SYSTEMS:    CONSTITUTIONAL: No weakness, fevers or chills  EYES/ENT: No visual changes;  No vertigo or throat pain   NECK: No pain or stiffness  RESPIRATORY: No cough, wheezing, hemoptysis; No shortness of breath  CARDIOVASCULAR: No chest pain or palpitations  GASTROINTESTINAL: No abdominal or epigastric pain. No nausea, vomiting, or hematemesis; No diarrhea or constipation. No melena or hematochezia.  GENITOURINARY: No dysuria, frequency or hematuria  NEUROLOGICAL: See HPI  SKIN: No itching, burning, rashes, or lesions   All other review of systems is negative unless indicated above.

## 2021-12-19 NOTE — ED PROVIDER NOTE - PATIENT PORTAL LINK FT
You can access the FollowMyHealth Patient Portal offered by [facility] by registering at the following website: [email or web address]. By joining [facility]’s FollowMyHealth portal, you will also be able to view your health information using other applications (apps) compatible with our system.

## 2021-12-20 ENCOUNTER — APPOINTMENT (OUTPATIENT)
Dept: OPHTHALMOLOGY | Facility: CLINIC | Age: 61
End: 2021-12-20
Payer: MEDICAID

## 2021-12-20 ENCOUNTER — NON-APPOINTMENT (OUTPATIENT)
Age: 61
End: 2021-12-20

## 2021-12-20 PROCEDURE — 92014 COMPRE OPH EXAM EST PT 1/>: CPT

## 2021-12-27 ENCOUNTER — APPOINTMENT (OUTPATIENT)
Dept: OPHTHALMOLOGY | Facility: CLINIC | Age: 61
End: 2021-12-27

## 2022-10-06 ENCOUNTER — APPOINTMENT (OUTPATIENT)
Dept: ORTHOPEDIC SURGERY | Facility: CLINIC | Age: 62
End: 2022-10-06

## 2023-01-24 ENCOUNTER — APPOINTMENT (OUTPATIENT)
Dept: ORTHOPEDIC SURGERY | Facility: CLINIC | Age: 63
End: 2023-01-24
Payer: MEDICAID

## 2023-01-24 VITALS
HEART RATE: 68 BPM | WEIGHT: 177 LBS | DIASTOLIC BLOOD PRESSURE: 77 MMHG | BODY MASS INDEX: 31.36 KG/M2 | HEIGHT: 63 IN | SYSTOLIC BLOOD PRESSURE: 135 MMHG | OXYGEN SATURATION: 95 %

## 2023-01-24 DIAGNOSIS — M54.2 CERVICALGIA: ICD-10-CM

## 2023-01-24 PROCEDURE — 99215 OFFICE O/P EST HI 40 MIN: CPT | Mod: 25

## 2023-01-24 PROCEDURE — 20553 NJX 1/MLT TRIGGER POINTS 3/>: CPT

## 2023-01-24 PROCEDURE — 72100 X-RAY EXAM L-S SPINE 2/3 VWS: CPT

## 2023-01-24 NOTE — PHYSICAL EXAM
[Normal] : Gait: normal [de-identified] : Constitutional: Well-nourished, well-developed, No acute distress\par Respiratory:  Good respiratory effort, no SOB\par Lymphatic: No regional lymphadenopathy, no lymphedema\par Psychiatric: Pleasant and normal affect, alert and oriented x3\par Skin: Clean dry and intact B/L UE/LE\par Musculoskeletal: normal except where as noted in regional exam\par \par C-spine: ROM painful, pain on left with spurlings, +ttp paraspinals and upper traps\par \par Left Shoulder:\par APPEARANCE: no marked deformities, no swelling or malalignment\par POSITIVE TENDERNESS:supraspinatus, LH biceps, AC joint\par NONTENDER: infraspinatus, teres minor,anterior and posterior capsule\par ROM: +painful arc, no scapular winging or dyskinesia present\par RESISTIVE TESTING: pain with  5/5 resisted flex/ext, empty can/ER/IR, horizontal abd/add \par SPECIAL TESTS: neg Drop Arm, + Empty Can, + Bonner/Neers, neg Brewer's, neg Speeds, neg Apprehension, neg cross arm adduction, neg apley's scratch test\par Vasc: 2+ radial pulse\par Neuro: AIN, PIN, Ulnar nerve intact to motor, DTRs 2+/4 biceps, triceps, brachioradialis\par Sensation: Intact to light touch throughout\par B/L Elbows:  No asymmetry, malalignment, or swelling, Full ROM, 5/5 strength in flexion/ext, pronation/supination, Joints stable\par B/L Wrist and Hand:  No asymmetry, malalignment, or swelling, Full ROM, 5/5 strength in wrist and long finger flexion/ext, radial/ulnar deviation, Joints stable\par  [de-identified] : Start Imaging: The following radiographs were ordered and read by me during this patient's visit. I reviewed each radiograph in detail with the patient and discussed the findings as highlighted below. \par \par 2-3 Views of the c-spine were obtained today that show no fracture, or dislocation. There are no degenerative changes seen. There is no malalignment. No obvious osseous abnormality. Otherwise unremarkable.\par

## 2023-01-24 NOTE — DISCUSSION/SUMMARY
[de-identified] : Discussed findings of today's exam and possible causes of patient's pain.  Educated patient on their most probable diagnosis of cervical myofascial pain. Reviewed possible courses of treatment, and we collaboratively decided best course of treatment at this time will include referral to PT and trigger point injections provided today.  Follow up for TPI as needed.  \par \par Aundrea Mcadams MD, EdM\par Sports Medicine [facility]\par Department of Orthopedics

## 2023-01-24 NOTE — ADDENDUM
[FreeTextEntry1] : I Amena Valdivia, MARYBETH, assisted in the history and documentation of the patient with Dr Aundrea Mcadams on 1/24/23

## 2023-01-24 NOTE — PROCEDURE
[de-identified] : Injection: Trigger Point Injection.\par Indication: Myofascial trigger point of the left trapezius, splenius capitus, levator scapulae\par \par A discussion was had with the patient regarding this procedure and all questions were answered. All risks, benefits and alternatives were discussed. These included but were not limited to bleeding, infection, allergic reaction, and possibility of pneumothorax.  A timeout was done to ensure correct side and location of identified trigger points and pt agreed to the procedure.   Alcohol was used to clean the skin. Ethyl chloride spray was then used as a topical anesthetic. A 5cc syringe was filled with 4cc of 1% lidocaine without epi and 1cc of 40mg/mL of methylprednisolone.  A 27-gauge 1.5" needle was used to inject 1 cc into each trigger point . A sterile bandage was then applied. The patient tolerated the procedure well and there were no complications.

## 2023-01-24 NOTE — HISTORY OF PRESENT ILLNESS
[de-identified] : REGULO   is a 62 year old right  hand dominant F who is following up today with a new complaint of neck pan. Patient was last seen in this office for hand/thumb pain in October 2020. \par Today the pain is primarily located left lateral c-spine. It began in 2 months ago without injury or trauma.  Pain is described as sharp  in nature, 8/10 in intensity, worse with movement.  \par States that the pain is constant\par Denies any swelling\par States  that she has started physical therapy with no relief in her symptoms\par Denies pain radiates to left upper extremity\par Denies numbness/tingling \par No XR prior to this visit\par Medication - motrin with very little relief\par Denies Prior injury\par Denies bowel/bladder changes, fevers, chills, saddle anesthesia.

## 2023-04-11 ENCOUNTER — NON-APPOINTMENT (OUTPATIENT)
Age: 63
End: 2023-04-11

## 2023-04-14 ENCOUNTER — APPOINTMENT (OUTPATIENT)
Dept: ORTHOPEDIC SURGERY | Facility: CLINIC | Age: 63
End: 2023-04-14
Payer: MEDICAID

## 2023-04-14 VITALS
DIASTOLIC BLOOD PRESSURE: 78 MMHG | WEIGHT: 178 LBS | HEART RATE: 62 BPM | SYSTOLIC BLOOD PRESSURE: 123 MMHG | HEIGHT: 63 IN | BODY MASS INDEX: 31.54 KG/M2

## 2023-04-14 DIAGNOSIS — M25.511 PAIN IN RIGHT SHOULDER: ICD-10-CM

## 2023-04-14 DIAGNOSIS — M25.512 PAIN IN RIGHT SHOULDER: ICD-10-CM

## 2023-04-14 PROCEDURE — 99214 OFFICE O/P EST MOD 30 MIN: CPT | Mod: 25

## 2023-04-14 PROCEDURE — 20610 DRAIN/INJ JOINT/BURSA W/O US: CPT | Mod: LT

## 2023-04-14 PROCEDURE — 73030 X-RAY EXAM OF SHOULDER: CPT | Mod: 50

## 2023-06-22 DIAGNOSIS — M75.102 UNSPECIFIED ROTATOR CUFF TEAR OR RUPTURE OF LEFT SHOULDER, NOT SPECIFIED AS TRAUMATIC: ICD-10-CM
